# Patient Record
(demographics unavailable — no encounter records)

---

## 2025-03-11 NOTE — HISTORY OF PRESENT ILLNESS
[de-identified] : 3/11/25  The patient is a 34 year old male who presents today complaining of lower back pain. began a few days ago. Severe low back pain .  Radiates down right leg to anterior thigh.  At times goes below knee.  IN past had history of HNP 5 years ago treated with muscle relaxants. Since has  mild intermittent  low back pan for a few hours.  Tried advil 3 every 4-6 hours.  DId not give relief.   No bowel o rbladder symptoms.  PMH: none  All: none   Date of Injury/Onset: 3/8/25 Pain:    At Rest: 8-9/10 With Activity:  7/10 Mechanism of injury: NKI Quality of symptoms:  stabbing, twisting, radiates down laterally to just below knee, tingling. Improves with: massage gun Worse with: sitting to standing, laying down.  Prior treatment: No. Prior Imaging: No. Additional Information: History of herniated L5 disc 5 years ago.  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

## 2025-03-11 NOTE — ASSESSMENT
[FreeTextEntry1] : 34 M with R L4 radic liekly progression of preivous H NPs MDP  We will also prescribe Muscle Relaxants as needed.  Discussed side effects including but not limited to drowsiness and dizziness.  Discussed patient should not drive after taking the medicine. Ibuprofen to restart after MDP done. .  Discussed major side effects of medication including but not limited to gastritis and acute kidney injury.  He was instructed to take with food and to discontinue use if stomach or esophageal pain developed.  PT Fu 6 weeks if pain persists updated MRI and consider Mikey

## 2025-03-11 NOTE — IMAGING
[de-identified] : Spine: Inspection/Palpation: No tenderness to palpation throughout Cervical/thoracic/lumbar spine. No bony stepoffs, No lesions.  Gait: antalgic, able to perform bilateral toe and heel rise.   Range of Motion: Lumbar Spine: Flexion to 10 degrees, Extension to 30 degrees,   Neurologic:  Bilateral Lower Extremities 5/5 Iliopsoas/Quadriceps/Hamstrings/ Tibialis Anterior/ Gastrocnemius. Extensor Hallucis Longus/ Flexor Hallucis Longus except    Sensation intact to light touch L2-S1  X-ray Ap/Lateral of lumbar spine were viewed and interpreted.  Normal alignment is maintained without any spondylolisthesis.    MRI Lumbar spine reviewed and interpreted independently.  Agree with report as follows.  from [facility] 2019 Spine: IVertebral body heights are maintained. There is trace retrolisthesis of L5 on S1. The bone marrow signal is unremarkable.  Multilevel disc desiccation is seen.  Conus medullaris terminates at the L1 level, and demonstrates normal signal. There is no abnormal thickening of the cauda equina nerve roots.  At T12-L1, there is no significant disc herniation, central spinal canal stenosis or neural foraminal narrowing.  At L1-L2, there is no significant disc herniation, central spinal canal stenosis or neural foraminal narrowing.  At L2-L3, there is no significant disc herniation, central spinal canal stenosis or neural foraminal narrowing.  At L3-L4, there is a disc bulge with a left extraforaminal disc protrusion resulting in mild right and moderate left neural foraminal narrowing and mild central canal stenosis. Disc abuts the exiting left L3 nerve root.  At L4-L5, there is a disc bulge with superimposed central disc protrusion with mild facet arthropathy resulting in mild bilateral neural foraminal narrowing and mild central canal stenosis.  At L5-S1, there is a disc bulge with superimposed central disc protrusion and mild facet arthropathy with moderate bilateral neural foraminal narrowing with disc abutting the bilateral exiting L5 and traversing S1 nerve roots.  The paravertebral soft tissues are unremarkable.  IMPRESSION:   At L3-L4, there is a disc bulge with a left extraforaminal disc protrusion resulting in mild right and moderate left neural foraminal narrowing and mild central canal stenosis. Disc abuts the exiting left L3 nerve root.  At L4-L5, there is a disc bulge with superimposed central disc protrusion with mild facet arthropathy resulting in mild bilateral neural foraminal narrowing and mild central canal stenosis.  At L5-S1, there is a disc bulge with superimposed central disc protrusion and mild facet arthropathy with moderate bilateral neural foraminal narrowing with disc abutting the bilateral exiting L5 and traversing S1 nerve roots.